# Patient Record
(demographics unavailable — no encounter records)

---

## 2024-10-22 NOTE — PHYSICAL EXAM
[Abdomen Masses] : Abdominal masses present [Abdomen Tenderness] : ~T ~M Abdominal tenderness [No HSM] : no hepatosplenomegaly [No Rash or Lesion] : No rash or lesion [Alert] : alert [Oriented to Person] : oriented to person [Oriented to Place] : oriented to place [Oriented to Time] : oriented to time [Calm] : calm [de-identified] : Soft [de-identified] : External - posterior anal fissure with a small skin tag; Artem and anoscopy deferred due to anal spasm and pain. [de-identified] : Normal [de-identified] : Normal [de-identified] : Normal [de-identified] : Normal [de-identified] : Normal

## 2024-10-22 NOTE — ASSESSMENT
[FreeTextEntry1] : 34 F w/ an anal fissure, refractory to non-surgical management. Reports severe pain. Wants to have botox injection. Plan; High fiber diet. Sitz baths. Discussed anoscopy and botox injection; also discussed possible complications. She is keen to schedule. All questions answered.

## 2024-10-22 NOTE — HISTORY OF PRESENT ILLNESS
[FreeTextEntry1] : 34-year-old female pt here with complaints of having an anal fissure for 2 years. Pt referred through [facility] doc. Has tried diltiazem for 30 days by a CRS in [place] 5-6 months ago helped initially but then stopped working. When she came back to [place] 2 months ago went Dr. Jonathan Araujo CRS and changed it to Nifedipine 2% for 8 weeks. Initially it worked but then stopped. And now the pain is so bad the pain last 6 hours  Bleeds with Bowel movement and if stool is hard it continues to bleed afterward onto her clothes Does warm sitz daily Takes MiraLAX, but then she gets diarrhea and then that burns Tried fiber Metamucil but made the stool too big and that was causing her pain too Uses Vaseline, lidocaine, and ibuprofen for pain. Is interested in Botox injection as an option

## 2025-01-29 NOTE — ASSESSMENT
[FreeTextEntry1] : 35 F here for postop visit. She is doing well; minimal pain; on exam, the anal fissure is healing. Plan: High fiber diet. Stool softeners/laxatives as needed. Sitz baths. See again after 1 month. All questions answered.

## 2025-01-29 NOTE — PHYSICAL EXAM
[Abdomen Masses] : Abdominal masses present [Abdomen Tenderness] : ~T ~M Abdominal tenderness [No HSM] : no hepatosplenomegaly [No Rash or Lesion] : No rash or lesion [Alert] : alert [Oriented to Person] : oriented to person [Oriented to Place] : oriented to place [Oriented to Time] : oriented to time [Calm] : calm [de-identified] : Soft [de-identified] : External - healing fissure; no anal spasm.  [de-identified] : Normal [de-identified] : Normal [de-identified] : Normal [de-identified] : Normal [de-identified] : Normal

## 2025-01-29 NOTE — HISTORY OF PRESENT ILLNESS
[FreeTextEntry1] : 35-year-old female pt s/p EUA anoscopy Botox injection excision of anal skin tag on 12/23/24  Pt here for follow up  Pain much better  Still bleeding everyday spotting, but more if she has a BM If she sits for a long time, say a couple of hours, she gets pain.   Moves her bowels once a day, has been constipated Has been taking Miralax for 2 weeks and everything was fine but when she stopped constipation came back Now she takes the Miralax at least twice a week and that's not really helping with the constipation. Pain is much more manageable and can go about her life  Pt eats a high fiber diet but still constipated